# Patient Record
Sex: FEMALE | NOT HISPANIC OR LATINO | URBAN - NONMETROPOLITAN AREA
[De-identification: names, ages, dates, MRNs, and addresses within clinical notes are randomized per-mention and may not be internally consistent; named-entity substitution may affect disease eponyms.]

---

## 2019-12-02 ENCOUNTER — IMPORTED ENCOUNTER (OUTPATIENT)
Dept: URBAN - NONMETROPOLITAN AREA CLINIC 1 | Facility: CLINIC | Age: 19
End: 2019-12-02

## 2019-12-02 PROCEDURE — 92004 COMPRE OPH EXAM NEW PT 1/>: CPT

## 2019-12-02 PROCEDURE — 92015 DETERMINE REFRACTIVE STATE: CPT

## 2019-12-02 NOTE — PATIENT DISCUSSION
Compound Myopic Astigmatism OD/Simple Myopia OS-  discussed findings w/patient-  new spectacle Rx issued-  monitor yearly or prn; 's Notes: MR 12/2/2019DFE 12/2/2019

## 2022-04-09 ASSESSMENT — VISUAL ACUITY
OD_CC: 20/25
OS_CC: 20/25

## 2022-04-09 ASSESSMENT — TONOMETRY
OS_IOP_MMHG: 16
OD_IOP_MMHG: 16